# Patient Record
Sex: FEMALE | Race: WHITE | Employment: FULL TIME | URBAN - METROPOLITAN AREA
[De-identification: names, ages, dates, MRNs, and addresses within clinical notes are randomized per-mention and may not be internally consistent; named-entity substitution may affect disease eponyms.]

---

## 2021-02-04 ENCOUNTER — HOSPITAL ENCOUNTER (EMERGENCY)
Age: 61
Discharge: HOME | End: 2021-02-04
Attending: EMERGENCY MEDICINE
Payer: COMMERCIAL

## 2021-02-04 VITALS
BODY MASS INDEX: 20.61 KG/M2 | TEMPERATURE: 97.8 F | OXYGEN SATURATION: 98 % | SYSTOLIC BLOOD PRESSURE: 155 MMHG | HEIGHT: 62 IN | DIASTOLIC BLOOD PRESSURE: 99 MMHG | RESPIRATION RATE: 16 BRPM | WEIGHT: 112 LBS | HEART RATE: 108 BPM

## 2021-02-04 DIAGNOSIS — H11.32 SUBCONJUNCTIVAL HEMORRHAGE OF LEFT EYE: Primary | ICD-10-CM

## 2021-02-04 LAB
BASOPHILS ABSOLUTE: 0 K/UL (ref 0–0.2)
BASOPHILS RELATIVE PERCENT: 0.6 %
EOSINOPHILS ABSOLUTE: 0.1 K/UL (ref 0–0.6)
EOSINOPHILS RELATIVE PERCENT: 1.4 %
HCT VFR BLD CALC: 45.7 % (ref 36–48)
HEMOGLOBIN: 15 G/DL (ref 12–16)
INR BLD: 2.02 (ref 0.86–1.14)
LYMPHOCYTES ABSOLUTE: 1.7 K/UL (ref 1–5.1)
LYMPHOCYTES RELATIVE PERCENT: 34.4 %
MCH RBC QN AUTO: 31.3 PG (ref 26–34)
MCHC RBC AUTO-ENTMCNC: 33 G/DL (ref 31–36)
MCV RBC AUTO: 95 FL (ref 80–100)
MONOCYTES ABSOLUTE: 0.4 K/UL (ref 0–1.3)
MONOCYTES RELATIVE PERCENT: 8.3 %
NEUTROPHILS ABSOLUTE: 2.7 K/UL (ref 1.7–7.7)
NEUTROPHILS RELATIVE PERCENT: 55.3 %
PDW BLD-RTO: 13.7 % (ref 12.4–15.4)
PLATELET # BLD: 156 K/UL (ref 135–450)
PMV BLD AUTO: 8.8 FL (ref 5–10.5)
PROTHROMBIN TIME: 23.6 SEC (ref 10–13.2)
RBC # BLD: 4.81 M/UL (ref 4–5.2)
WBC # BLD: 4.8 K/UL (ref 4–11)

## 2021-02-04 PROCEDURE — 85610 PROTHROMBIN TIME: CPT

## 2021-02-04 PROCEDURE — 6370000000 HC RX 637 (ALT 250 FOR IP): Performed by: PHYSICIAN ASSISTANT

## 2021-02-04 PROCEDURE — 99283 EMERGENCY DEPT VISIT LOW MDM: CPT

## 2021-02-04 PROCEDURE — 85025 COMPLETE CBC W/AUTO DIFF WBC: CPT

## 2021-02-04 RX ORDER — LISINOPRIL 5 MG/1
5 TABLET ORAL DAILY
COMMUNITY
Start: 2021-01-21

## 2021-02-04 RX ORDER — TETRACAINE HYDROCHLORIDE 5 MG/ML
1 SOLUTION OPHTHALMIC ONCE
Status: COMPLETED | OUTPATIENT
Start: 2021-02-04 | End: 2021-02-04

## 2021-02-04 RX ORDER — AMLODIPINE BESYLATE 5 MG/1
5 TABLET ORAL DAILY
COMMUNITY
Start: 2021-01-21

## 2021-02-04 RX ORDER — WARFARIN SODIUM 5 MG/1
5 TABLET ORAL DAILY
Status: ON HOLD | COMMUNITY
Start: 2020-11-11 | End: 2021-08-26 | Stop reason: SDUPTHER

## 2021-02-04 RX ADMIN — TETRACAINE HYDROCHLORIDE 1 DROP: 5 SOLUTION OPHTHALMIC at 10:48

## 2021-02-04 RX ADMIN — FLUORESCEIN SODIUM 1 MG: 1 STRIP OPHTHALMIC at 10:49

## 2021-02-04 ASSESSMENT — VISUAL ACUITY
OU: 1
OD: 20/25
OU: 20/20

## 2021-02-04 ASSESSMENT — ENCOUNTER SYMPTOMS
EYE PAIN: 0
EYE REDNESS: 1
COUGH: 0
VOMITING: 0
RHINORRHEA: 0
ABDOMINAL PAIN: 0
TROUBLE SWALLOWING: 0
SHORTNESS OF BREATH: 0
WHEEZING: 0
EYE ITCHING: 0
FACIAL SWELLING: 0
SORE THROAT: 0
DIARRHEA: 0
NAUSEA: 0
CHEST TIGHTNESS: 0
EYE DISCHARGE: 0
BACK PAIN: 0
PHOTOPHOBIA: 0

## 2021-02-04 NOTE — ED NOTES
Pt is alert and oriented times four. Pt here today for pressure in her left eye and blood collected in her left eye. Pt states started this am. Pt denies injury to her left eye and denies pain. Pt straight stuck for blood. No other needs at this time will continue to monitor.       Petra Osborne RN  02/04/21 1052

## 2021-02-04 NOTE — ED PROVIDER NOTES
light-headedness, numbness and headaches. Past Medical, Surgical, Family, and Social History     She has a past medical history of Anticoagulant disorder ([address] and Hypertension. She has no past surgical history on file. Her family history is not on file. She reports that she has never smoked. She has never used smokeless tobacco. She reports previous alcohol use. She reports previous drug use. Medications     Previous Medications    AMLODIPINE (NORVASC) 5 MG TABLET    Take 5 mg by mouth daily    LISINOPRIL (PRINIVIL;ZESTRIL) 5 MG TABLET    Take 5 mg by mouth daily    WARFARIN (COUMADIN) 5 MG TABLET    Take 5 mg by mouth daily       Allergies     She is allergic to shellfish allergy. Physical Exam     INITIAL VITALS: BP: (!) 167/104, Temp: 97.8 °F (36.6 °C), Pulse: 134, Resp: 17, SpO2: 96 %  Physical Exam  Vitals signs and nursing note reviewed. Constitutional:       General: She is not in acute distress. Appearance: Normal appearance. She is not ill-appearing, toxic-appearing or diaphoretic. HENT:      Head: Normocephalic and atraumatic. Right Ear: External ear normal.      Nose: Nose normal.      Mouth/Throat:      Pharynx: Oropharynx is clear. Eyes:      General: Lids are normal. Vision grossly intact. Gaze aligned appropriately. Right eye: No foreign body, discharge or hordeolum. Left eye: No foreign body, discharge or hordeolum. Extraocular Movements: Extraocular movements intact. Conjunctiva/sclera:      Right eye: Right conjunctiva is not injected. No chemosis, exudate or hemorrhage. Left eye: Left conjunctiva is not injected. Chemosis and hemorrhage present. No exudate. Pupils: Pupils are equal, round, and reactive to light. Right eye: Pupil is round and reactive. Left eye: Pupil is round and reactive. No corneal abrasion or fluorescein uptake. Funduscopic exam:     Right eye: No hemorrhage. Left eye: No hemorrhage. with spontaneous subconjunctival hemorrhage to the left eye. No trauma or injury to the eye. No pain with eye movement or periorbital redness or swelling. She does not wear glasses or contacts except to read. Patient has no acute eye pain and no acute vision changes. She has no floaters or complaints of flashing lights. She has no layering of hyphema on exam.  She does have a subconjunctival hemorrhage to the medial aspect of the left eye. She does have a history of hypertension and is also on Coumadin. Patient has normal Cyrus-Pen pressures. She has normal visual acuity. She had no uptake with fluorescein. Pro time was 23.6 with INR of 2.0 and CBC showed a white count of 4.8 with hemoglobin of 15.0 and 156 platelets. Patient will be referred to [facility] for follow-up. Patient is also referred to PCP for follow-up for her blood pressure. At this point she stable for discharge. Blood pressure and pulse have improved since patient has been here. This patient was also evaluated by the attending physician. All care plans were discussed and agreed upon. Clinical Impression     1.  Subconjunctival hemorrhage of left eye        Disposition     PATIENT REFERRED TO:  [facility]  [address] [facility]    If symptoms worsen    [address]  [phone]    Schedule an appointment as soon as possible for a visit in 2 days      your doctor      for blood pressure recheck      DISCHARGE MEDICATIONS:  New Prescriptions    No medications on file       DISPOSITION  discharged        James Johnson  02/04/21 Liu Calvillo [place]  02/04/21 4876

## 2021-02-04 NOTE — ED PROVIDER NOTES
ED Attending Attestation Note     Date of evaluation: 2/4/2021    This patient was seen by the advance practice provider. I have seen and examined the patient, agree with the workup, evaluation, management and diagnosis. The care plan has been discussed. My assessment reveals some conjunctival hemorrhage in the medial left eye; no abrasion is noted. Normal intraocular pressures, no sign of hyphema. No trauma to the eye. Zohra Junior MD  02/04/21 1124

## 2021-02-04 NOTE — ED NOTES
Pt discharged from ED in stable, ambulatory condition. Discharge instructions explained, all questions answered. Pt walked to [facility] independently.        [place], RN  02/04/21 7852

## 2021-08-23 ENCOUNTER — HOSPITAL ENCOUNTER (INPATIENT)
Age: 61
LOS: 3 days | Discharge: HOME | DRG: 177 | End: 2021-08-26
Attending: EMERGENCY MEDICINE | Admitting: INTERNAL MEDICINE
Payer: COMMERCIAL

## 2021-08-23 ENCOUNTER — APPOINTMENT (OUTPATIENT)
Dept: GENERAL RADIOLOGY | Age: 61
DRG: 177 | End: 2021-08-23
Payer: COMMERCIAL

## 2021-08-23 DIAGNOSIS — U07.1 ACUTE HYPOXEMIC RESPIRATORY FAILURE DUE TO COVID-19 (HCC): ICD-10-CM

## 2021-08-23 DIAGNOSIS — J96.01 ACUTE HYPOXEMIC RESPIRATORY FAILURE (HCC): Primary | ICD-10-CM

## 2021-08-23 DIAGNOSIS — R79.1 SUPRATHERAPEUTIC INR: ICD-10-CM

## 2021-08-23 DIAGNOSIS — J96.01 ACUTE HYPOXEMIC RESPIRATORY FAILURE DUE TO COVID-19 (HCC): ICD-10-CM

## 2021-08-23 DIAGNOSIS — U07.1 COVID-19: ICD-10-CM

## 2021-08-23 PROBLEM — J12.82 PNEUMONIA DUE TO 2019 NOVEL CORONAVIRUS: Status: ACTIVE | Noted: 2021-08-23

## 2021-08-23 PROBLEM — E87.1 HYPONATREMIA: Status: ACTIVE | Noted: 2021-08-23

## 2021-08-23 LAB
ALBUMIN SERPL-MCNC: 3.4 G/DL (ref 3.4–5)
ALP BLD-CCNC: 42 U/L (ref 40–129)
ALT SERPL-CCNC: 14 U/L (ref 10–40)
ANION GAP SERPL CALCULATED.3IONS-SCNC: 15 MMOL/L (ref 3–16)
ANION GAP SERPL CALCULATED.3IONS-SCNC: 15 MMOL/L (ref 3–16)
AST SERPL-CCNC: 34 U/L (ref 15–37)
BASE EXCESS VENOUS: 6.3 MMOL/L (ref -2–3)
BASOPHILS ABSOLUTE: 0 K/UL (ref 0–0.2)
BASOPHILS RELATIVE PERCENT: 0.1 %
BILIRUB SERPL-MCNC: 0.4 MG/DL (ref 0–1)
BILIRUBIN DIRECT: <0.2 MG/DL (ref 0–0.3)
BILIRUBIN, INDIRECT: NORMAL MG/DL (ref 0–1)
BUN BLDV-MCNC: 11 MG/DL (ref 7–20)
BUN BLDV-MCNC: 8 MG/DL (ref 7–20)
C-REACTIVE PROTEIN: 117.9 MG/L (ref 0–5.1)
CALCIUM SERPL-MCNC: 8.7 MG/DL (ref 8.3–10.6)
CALCIUM SERPL-MCNC: 9 MG/DL (ref 8.3–10.6)
CARBOXYHEMOGLOBIN: 1.1 % (ref 0–1.5)
CHLORIDE BLD-SCNC: 93 MMOL/L (ref 99–110)
CHLORIDE BLD-SCNC: 98 MMOL/L (ref 99–110)
CO2: 21 MMOL/L (ref 21–32)
CO2: 22 MMOL/L (ref 21–32)
CREAT SERPL-MCNC: 0.8 MG/DL (ref 0.6–1.2)
CREAT SERPL-MCNC: 0.8 MG/DL (ref 0.6–1.2)
EOSINOPHILS ABSOLUTE: 0 K/UL (ref 0–0.6)
EOSINOPHILS RELATIVE PERCENT: 0 %
FERRITIN: 1240 NG/ML (ref 15–150)
FIBRINOGEN: 424 MG/DL (ref 200–397)
GFR AFRICAN AMERICAN: >60
GFR AFRICAN AMERICAN: >60
GFR NON-AFRICAN AMERICAN: >60
GFR NON-AFRICAN AMERICAN: >60
GLUCOSE BLD-MCNC: 132 MG/DL (ref 70–99)
GLUCOSE BLD-MCNC: 141 MG/DL (ref 70–99)
HCO3 VENOUS: 32.2 MMOL/L (ref 24–28)
HCT VFR BLD CALC: 44.6 % (ref 36–48)
HEMOGLOBIN, VEN, REDUCED: 67.5 %
HEMOGLOBIN: 15.2 G/DL (ref 12–16)
INR BLD: >14.27 (ref 0.88–1.12)
LACTATE DEHYDROGENASE: 491 U/L (ref 100–190)
LACTIC ACID: 1.2 MMOL/L (ref 0.4–2)
LYMPHOCYTES ABSOLUTE: 0.3 K/UL (ref 1–5.1)
LYMPHOCYTES RELATIVE PERCENT: 7 %
MCH RBC QN AUTO: 31.2 PG (ref 26–34)
MCHC RBC AUTO-ENTMCNC: 34.2 G/DL (ref 31–36)
MCV RBC AUTO: 91.3 FL (ref 80–100)
METHEMOGLOBIN VENOUS: 0 % (ref 0–1.5)
MONOCYTES ABSOLUTE: 0.4 K/UL (ref 0–1.3)
MONOCYTES RELATIVE PERCENT: 8.5 %
NEUTROPHILS ABSOLUTE: 3.9 K/UL (ref 1.7–7.7)
NEUTROPHILS RELATIVE PERCENT: 84.4 %
O2 SAT, VEN: 32 %
PCO2, VEN: 49 MMHG (ref 41–51)
PDW BLD-RTO: 13.5 % (ref 12.4–15.4)
PH VENOUS: 7.43 (ref 7.35–7.45)
PLATELET # BLD: 92 K/UL (ref 135–450)
PLATELET SLIDE REVIEW: ABNORMAL
PMV BLD AUTO: 9.4 FL (ref 5–10.5)
PO2, VEN: ABNORMAL MMHG (ref 25–40)
POTASSIUM REFLEX MAGNESIUM: 4.1 MMOL/L (ref 3.5–5.1)
POTASSIUM SERPL-SCNC: 4.2 MMOL/L (ref 3.5–5.1)
PROCALCITONIN: 0.1 NG/ML (ref 0–0.15)
PROTHROMBIN TIME: >170 SEC (ref 9.9–12.7)
RAPID INFLUENZA  B AGN: NEGATIVE
RAPID INFLUENZA A AGN: NEGATIVE
RBC # BLD: 4.89 M/UL (ref 4–5.2)
REASON FOR REJECTION: NORMAL
REASON FOR REJECTION: NORMAL
REJECTED TEST: NORMAL
REJECTED TEST: NORMAL
SLIDE REVIEW: ABNORMAL
SODIUM BLD-SCNC: 130 MMOL/L (ref 136–145)
SODIUM BLD-SCNC: 134 MMOL/L (ref 136–145)
TCO2 CALC VENOUS: 34 MMOL/L
TOTAL PROTEIN: 7.7 G/DL (ref 6.4–8.2)
VITAMIN D 25-HYDROXY: 30.9 NG/ML
WBC # BLD: 4.6 K/UL (ref 4–11)

## 2021-08-23 PROCEDURE — 80048 BASIC METABOLIC PNL TOTAL CA: CPT

## 2021-08-23 PROCEDURE — 99283 EMERGENCY DEPT VISIT LOW MDM: CPT

## 2021-08-23 PROCEDURE — 80076 HEPATIC FUNCTION PANEL: CPT

## 2021-08-23 PROCEDURE — 6360000002 HC RX W HCPCS: Performed by: INTERNAL MEDICINE

## 2021-08-23 PROCEDURE — 84145 PROCALCITONIN (PCT): CPT

## 2021-08-23 PROCEDURE — 6360000002 HC RX W HCPCS: Performed by: EMERGENCY MEDICINE

## 2021-08-23 PROCEDURE — 2580000003 HC RX 258: Performed by: INTERNAL MEDICINE

## 2021-08-23 PROCEDURE — 96374 THER/PROPH/DIAG INJ IV PUSH: CPT

## 2021-08-23 PROCEDURE — 6370000000 HC RX 637 (ALT 250 FOR IP): Performed by: EMERGENCY MEDICINE

## 2021-08-23 PROCEDURE — 2580000003 HC RX 258: Performed by: EMERGENCY MEDICINE

## 2021-08-23 PROCEDURE — 85610 PROTHROMBIN TIME: CPT

## 2021-08-23 PROCEDURE — 36415 COLL VENOUS BLD VENIPUNCTURE: CPT

## 2021-08-23 PROCEDURE — 71045 X-RAY EXAM CHEST 1 VIEW: CPT

## 2021-08-23 PROCEDURE — 83615 LACTATE (LD) (LDH) ENZYME: CPT

## 2021-08-23 PROCEDURE — 85384 FIBRINOGEN ACTIVITY: CPT

## 2021-08-23 PROCEDURE — XW033E5 INTRODUCTION OF REMDESIVIR ANTI-INFECTIVE INTO PERIPHERAL VEIN, PERCUTANEOUS APPROACH, NEW TECHNOLOGY GROUP 5: ICD-10-PCS | Performed by: INTERNAL MEDICINE

## 2021-08-23 PROCEDURE — 84300 ASSAY OF URINE SODIUM: CPT

## 2021-08-23 PROCEDURE — 2060000000 HC ICU INTERMEDIATE R&B

## 2021-08-23 PROCEDURE — 82803 BLOOD GASES ANY COMBINATION: CPT

## 2021-08-23 PROCEDURE — 85025 COMPLETE CBC W/AUTO DIFF WBC: CPT

## 2021-08-23 PROCEDURE — 82306 VITAMIN D 25 HYDROXY: CPT

## 2021-08-23 PROCEDURE — 87804 INFLUENZA ASSAY W/OPTIC: CPT

## 2021-08-23 PROCEDURE — 81001 URINALYSIS AUTO W/SCOPE: CPT

## 2021-08-23 PROCEDURE — 86140 C-REACTIVE PROTEIN: CPT

## 2021-08-23 PROCEDURE — 2500000003 HC RX 250 WO HCPCS: Performed by: INTERNAL MEDICINE

## 2021-08-23 PROCEDURE — 6370000000 HC RX 637 (ALT 250 FOR IP): Performed by: INTERNAL MEDICINE

## 2021-08-23 PROCEDURE — 82728 ASSAY OF FERRITIN: CPT

## 2021-08-23 PROCEDURE — 83605 ASSAY OF LACTIC ACID: CPT

## 2021-08-23 RX ORDER — SODIUM CHLORIDE, SODIUM LACTATE, POTASSIUM CHLORIDE, AND CALCIUM CHLORIDE .6; .31; .03; .02 G/100ML; G/100ML; G/100ML; G/100ML
500 INJECTION, SOLUTION INTRAVENOUS ONCE
Status: COMPLETED | OUTPATIENT
Start: 2021-08-23 | End: 2021-08-23

## 2021-08-23 RX ORDER — POLYETHYLENE GLYCOL 3350 17 G/17G
17 POWDER, FOR SOLUTION ORAL DAILY PRN
Status: DISCONTINUED | OUTPATIENT
Start: 2021-08-23 | End: 2021-08-26 | Stop reason: HOSPADM

## 2021-08-23 RX ORDER — VITAMIN B COMPLEX
2000 TABLET ORAL DAILY
Status: DISCONTINUED | OUTPATIENT
Start: 2021-08-23 | End: 2021-08-26 | Stop reason: HOSPADM

## 2021-08-23 RX ORDER — SIMETHICONE 80 MG
80 TABLET,CHEWABLE ORAL EVERY 6 HOURS PRN
Status: DISCONTINUED | OUTPATIENT
Start: 2021-08-23 | End: 2021-08-26 | Stop reason: HOSPADM

## 2021-08-23 RX ORDER — ACETAMINOPHEN 650 MG/1
650 SUPPOSITORY RECTAL EVERY 6 HOURS PRN
Status: DISCONTINUED | OUTPATIENT
Start: 2021-08-23 | End: 2021-08-26 | Stop reason: HOSPADM

## 2021-08-23 RX ORDER — ZINC SULFATE 50(220)MG
50 CAPSULE ORAL DAILY
Status: DISCONTINUED | OUTPATIENT
Start: 2021-08-23 | End: 2021-08-26 | Stop reason: HOSPADM

## 2021-08-23 RX ORDER — SODIUM CHLORIDE 0.9 % (FLUSH) 0.9 %
5-40 SYRINGE (ML) INJECTION PRN
Status: DISCONTINUED | OUTPATIENT
Start: 2021-08-23 | End: 2021-08-23 | Stop reason: SDUPTHER

## 2021-08-23 RX ORDER — METHYLPREDNISOLONE SODIUM SUCCINATE 40 MG/ML
40 INJECTION, POWDER, LYOPHILIZED, FOR SOLUTION INTRAMUSCULAR; INTRAVENOUS EVERY 12 HOURS
Status: DISCONTINUED | OUTPATIENT
Start: 2021-08-23 | End: 2021-08-26 | Stop reason: HOSPADM

## 2021-08-23 RX ORDER — SODIUM CHLORIDE 0.9 % (FLUSH) 0.9 %
5-40 SYRINGE (ML) INJECTION EVERY 12 HOURS SCHEDULED
Status: DISCONTINUED | OUTPATIENT
Start: 2021-08-23 | End: 2021-08-23 | Stop reason: SDUPTHER

## 2021-08-23 RX ORDER — SODIUM CHLORIDE 0.9 % (FLUSH) 0.9 %
5-40 SYRINGE (ML) INJECTION EVERY 12 HOURS SCHEDULED
Status: DISCONTINUED | OUTPATIENT
Start: 2021-08-23 | End: 2021-08-26 | Stop reason: HOSPADM

## 2021-08-23 RX ORDER — ACETAMINOPHEN 325 MG/1
650 TABLET ORAL EVERY 6 HOURS PRN
Status: DISCONTINUED | OUTPATIENT
Start: 2021-08-23 | End: 2021-08-26 | Stop reason: HOSPADM

## 2021-08-23 RX ORDER — ONDANSETRON 4 MG/1
4 TABLET, ORALLY DISINTEGRATING ORAL EVERY 8 HOURS PRN
Status: DISCONTINUED | OUTPATIENT
Start: 2021-08-23 | End: 2021-08-23 | Stop reason: SDUPTHER

## 2021-08-23 RX ORDER — ONDANSETRON 2 MG/ML
4 INJECTION INTRAMUSCULAR; INTRAVENOUS EVERY 6 HOURS PRN
Status: DISCONTINUED | OUTPATIENT
Start: 2021-08-23 | End: 2021-08-23 | Stop reason: SDUPTHER

## 2021-08-23 RX ORDER — ACETAMINOPHEN 650 MG/1
650 SUPPOSITORY RECTAL EVERY 6 HOURS PRN
Status: DISCONTINUED | OUTPATIENT
Start: 2021-08-23 | End: 2021-08-23 | Stop reason: SDUPTHER

## 2021-08-23 RX ORDER — SODIUM CHLORIDE 0.9 % (FLUSH) 0.9 %
5-40 SYRINGE (ML) INJECTION PRN
Status: DISCONTINUED | OUTPATIENT
Start: 2021-08-23 | End: 2021-08-26 | Stop reason: HOSPADM

## 2021-08-23 RX ORDER — GUAIFENESIN/DEXTROMETHORPHAN 100-10MG/5
5 SYRUP ORAL EVERY 4 HOURS PRN
Status: DISCONTINUED | OUTPATIENT
Start: 2021-08-23 | End: 2021-08-26 | Stop reason: HOSPADM

## 2021-08-23 RX ORDER — PANTOPRAZOLE SODIUM 40 MG/1
40 TABLET, DELAYED RELEASE ORAL
Status: DISCONTINUED | OUTPATIENT
Start: 2021-08-23 | End: 2021-08-26 | Stop reason: HOSPADM

## 2021-08-23 RX ORDER — PHYTONADIONE 5 MG/1
5 TABLET ORAL ONCE
Status: COMPLETED | OUTPATIENT
Start: 2021-08-23 | End: 2021-08-23

## 2021-08-23 RX ORDER — SODIUM CHLORIDE 9 MG/ML
25 INJECTION, SOLUTION INTRAVENOUS PRN
Status: DISCONTINUED | OUTPATIENT
Start: 2021-08-23 | End: 2021-08-23 | Stop reason: SDUPTHER

## 2021-08-23 RX ORDER — ONDANSETRON 2 MG/ML
4 INJECTION INTRAMUSCULAR; INTRAVENOUS EVERY 6 HOURS PRN
Status: DISCONTINUED | OUTPATIENT
Start: 2021-08-23 | End: 2021-08-26 | Stop reason: HOSPADM

## 2021-08-23 RX ORDER — ACETAMINOPHEN 325 MG/1
650 TABLET ORAL EVERY 6 HOURS PRN
Status: DISCONTINUED | OUTPATIENT
Start: 2021-08-23 | End: 2021-08-23 | Stop reason: SDUPTHER

## 2021-08-23 RX ORDER — POLYETHYLENE GLYCOL 3350 17 G/17G
17 POWDER, FOR SOLUTION ORAL DAILY PRN
Status: DISCONTINUED | OUTPATIENT
Start: 2021-08-23 | End: 2021-08-23 | Stop reason: SDUPTHER

## 2021-08-23 RX ORDER — 0.9 % SODIUM CHLORIDE 0.9 %
30 INTRAVENOUS SOLUTION INTRAVENOUS PRN
Status: DISCONTINUED | OUTPATIENT
Start: 2021-08-23 | End: 2021-08-26 | Stop reason: HOSPADM

## 2021-08-23 RX ORDER — ONDANSETRON 2 MG/ML
4 INJECTION INTRAMUSCULAR; INTRAVENOUS ONCE
Status: COMPLETED | OUTPATIENT
Start: 2021-08-23 | End: 2021-08-23

## 2021-08-23 RX ORDER — PROMETHAZINE HYDROCHLORIDE 25 MG/1
12.5 TABLET ORAL EVERY 6 HOURS PRN
Status: DISCONTINUED | OUTPATIENT
Start: 2021-08-23 | End: 2021-08-26 | Stop reason: HOSPADM

## 2021-08-23 RX ORDER — SODIUM CHLORIDE 9 MG/ML
25 INJECTION, SOLUTION INTRAVENOUS PRN
Status: DISCONTINUED | OUTPATIENT
Start: 2021-08-23 | End: 2021-08-26 | Stop reason: HOSPADM

## 2021-08-23 RX ADMIN — PHYTONADIONE 5 MG: 5 TABLET ORAL at 16:28

## 2021-08-23 RX ADMIN — ONDANSETRON 4 MG: 2 INJECTION INTRAMUSCULAR; INTRAVENOUS at 22:24

## 2021-08-23 RX ADMIN — SODIUM CHLORIDE, POTASSIUM CHLORIDE, SODIUM LACTATE AND CALCIUM CHLORIDE 500 ML: 600; 310; 30; 20 INJECTION, SOLUTION INTRAVENOUS at 12:21

## 2021-08-23 RX ADMIN — REMDESIVIR 200 MG: 100 INJECTION, POWDER, LYOPHILIZED, FOR SOLUTION INTRAVENOUS at 17:59

## 2021-08-23 RX ADMIN — Medication 2000 UNITS: at 16:28

## 2021-08-23 RX ADMIN — Medication 5 ML: at 20:21

## 2021-08-23 RX ADMIN — ONDANSETRON 4 MG: 2 INJECTION INTRAMUSCULAR; INTRAVENOUS at 12:44

## 2021-08-23 RX ADMIN — ZINC SULFATE 220 MG (50 MG) CAPSULE 50 MG: CAPSULE at 20:20

## 2021-08-23 RX ADMIN — Medication 40 MG: at 16:27

## 2021-08-23 RX ADMIN — Medication 10 ML: at 22:25

## 2021-08-23 ASSESSMENT — PAIN DESCRIPTION - PAIN TYPE: TYPE: ACUTE PAIN

## 2021-08-23 ASSESSMENT — PAIN DESCRIPTION - LOCATION: LOCATION: ABDOMEN

## 2021-08-23 ASSESSMENT — PAIN SCALES - GENERAL
PAINLEVEL_OUTOF10: 6
PAINLEVEL_OUTOF10: 0
PAINLEVEL_OUTOF10: 0

## 2021-08-23 NOTE — H&P
Hospital Medicine History & Physical    PCP: No primary care provider on file. Date of Admission: 8/23/2021    Date of Service: Pt seen/examined on 08/23/21 and Admitted to Inpatient with expected LOS greater than two midnights due to medical therapy. Chief Complaint:  Multiple complains, worsening flu like symptoms      History Of Present Illness:   62 y.o. 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  - She started to have symptoms x 10 days back which she thought was a sinus infection, with increased sinus pressure, followed by feeling quesy in her stomach, loose watery diarrhea, later started to have high grade fever around 103 F, got COVID Ag test done from [facility] which came positive, she has been having progressively worsening symptoms, faye fatigue and nausea and has had decreased po intake due to this. She has been trying to eat fruits and drink water intermittent. She was found to be hypoxic to 86 % on room air and placed on 3L NC to maintain O2 sats, when walked to and from bathroom without oxygen, oxygen saturation droped in the high 70's spo2. She is not vaccinated for COVID, she was worried about the s/e of blood clots faye with her underlying lupus ac +ve status. On admission BP ok, HR 110s, labs with Na 130, no leukocytosis, lymphopenia and thrombocytopenia noted. INR was > 14 and 5 mg oral vitamin K was given in the ED. Xray showed bilateral lower lobe ground glass opacities consistent with COVID19 pneumonia, and will be admitted to progressive care unit for acute respiratory failure due to [address] pneumonia    Past Medical History:          Diagnosis Date    Anticoagulant disorder ([facility] [address] [facility]     Hypertension        Past Surgical History:      No past surgical history on file.     Medications Prior to Admission: Prior to Admission medications    Medication Sig Start Date End Date Taking? Authorizing Provider   amLODIPine (NORVASC) 5 MG tablet Take 5 mg by mouth daily 1/21/21   Historical Provider, MD   lisinopril (PRINIVIL;ZESTRIL) 5 MG tablet Take 5 mg by mouth daily 1/21/21   Historical Provider, MD   warfarin (COUMADIN) 5 MG tablet Take 5 mg by mouth daily 11/11/20   Historical Provider, MD       Allergies:  Shellfish allergy    Social History:      The patient currently lives at home with her son    TOBACCO:   reports that she has never smoked. She has never used smokeless tobacco.  ETOH:   reports previous alcohol use. Works as a sale person for [employer]    Family History:      No family history on file. REVIEW OF SYSTEMS:   Pertinent positives as noted in the HPI. All other systems reviewed and negative. PHYSICAL EXAM PERFORMED:    /89   Pulse 109   Temp 99.1 °F (37.3 °C) (Oral)   Resp 16   SpO2 98%     General appearance:  Ill appearing  HEENT:  Normal cephalic, atraumatic without obvious deformity. Pupils equal, round, and reactive to light. Extra ocular muscles intact. Conjunctivae/corneas clear. Neck: Supple, with full range of motion. No jugular venous distention. Trachea midline. Respiratory:  Normal respiratory effort. Decreased breath sounds, doesn't have strong respiratory effort, significant crackles bilaterally from bases up to mid lungs  Cardiovascular:  Regular rate and rhythm with normal S1/S2 without murmurs, rubs or gallops. Abdomen: Soft, non-tender, non-distended with normal bowel sounds. Musculoskeletal:  No clubbing, cyanosis or edema bilaterally. Full range of motion without deformity. Skin: Skin color, texture, turgor normal.  No rashes or lesions. Neurologic:  Neurovascularly intact without any focal sensory/motor deficits.  Cranial nerves: II-XII intact, grossly non-focal.  Psychiatric:  Alert and oriented, thought content appropriate, normal insight  Capillary Refill: Brisk,< 3 seconds   Peripheral Pulses: +2 palpable, equal bilaterally       Labs:     Recent Labs     08/23/21  1219   WBC 4.6   HGB 15.2   HCT 44.6   PLT 92*     Recent Labs     08/23/21  1219   *   K 4.1   CL 93*   CO2 22   BUN 11   CREATININE 0.8   CALCIUM 9.0     No results for input(s): AST, ALT, BILIDIR, BILITOT, ALKPHOS in the last 72 hours. Recent Labs     08/23/21  1444   INR >14.27*     No results for input(s): [place] in the last 72 hours. Urinalysis:    No results found for: Luisa Siu, [address], Carlota Professor Eleazar Mayer [facility] [address], [address] [place] [address] 27, [facility] 994    Radiology:     CXR: I have reviewed the CXR with the following interpretation: reviewed, bilateral lower lung airspace disease consistent with COVID19  EKG:  I have reviewed the EKG with the following interpretation: n/a    XR CHEST PORTABLE   Final Result      Bilateral lower lung airspace disease concerning for atypical/Covid 19 pneumonia. ASSESSMENT:    Active Hospital Problems    Diagnosis Date Noted    Acute hypoxemic respiratory failure due to COVID-19 (HCC) [U07.1, J96.01] 08/23/2021     Priority: High    Pneumonia due to 2019 novel coronavirus [U07.1, J12.82] 08/23/2021    Hyponatremia [E87.1] 08/23/2021    Supratherapeutic INR [R79.1] 08/23/2021     Acute respiratory failure due to 2019 n-coronavirus pneumonia, progressive symptoms x 1 weeks, [facility]  Exclude other causes - Rapid flu ordered  Leukopenia/normal WBC with lymphopenia; pro calcitonin negative; so will hold of bacterial etiology work up as low suspicion  Check Hepatic profile    Imaging: Reviewed, as above    Did not get sepsis protocol fluids as recommended to keep patients on the drier side with COVID19, but with sinus tachycardia, given IVF in the ED.    Check inflammatory markers and trend  Start Vitamin D 2000 units daily  Start Zn sulfate 50 mg OD  Start Solumedrol 40 mg IV Bid for now  If no significant transamites will proceed with Remdisevir rx  Lab Results   Component Value Date    CREATININE 0.8 08/23/2021    BUN 11 08/23/2021     (L) 08/23/2021    K 4.1 08/23/2021    CL 93 (L) 08/23/2021    CO2 22 08/23/2021     Remdesivir Initiation Note    Jt Kurtz meets criteria for initiation of remdesivir under the emergency use authorization (EUA) based on the following:   Known or suspected COVID-19   Severe disease (SpO2 ? 94% on RA, requiring supplemental O2, or requiring invasive mechanical ventilation)   Acceptable renal function  o CrCl ? 30 ml/min based on SCr obtained prior to initiation OR   o CrCl < 30 ml/min but the potential benefit of remdesivir outweighs the risk   Acceptable hepatic function (ALT within 5 times ULN)    I have discussed with the patient/proxy information consistent with the Fact Sheet for Patients and Parents/Caregivers\" and the patient/proxy has agreed to initiating remdesivir after being:   Given the Fact Sheet for Patients and Parents/Caregivers   Informed of the alternatives to receiving remdesivir, and    Informed that remdesivir is an unapproved drug that is authorized for use under EUA    Monitor O2 sats, place O2 to maintain for SpO2 > 90%  If Severe respiratory failure and need for noninvasive positive pressure ventilation, need negative pressure room as is more likely causes aerosolized secretions (BiPaP, CPAP, HiFlo NC). N95 with proper fitting for aerosolized procedures  Personal protective equipment including gown gloves surgical mask, eye protection  Monitor closely, if worse then will consider ID/Pulmonary evaluation    Hyponatremia likely due to decreased po intake, diarrhea, possible some SIADH from pneumonia. Will check UA, Urine Sodium    Supratherapeutic INR > 14 on admission, s/p oral Vit K  Follow up INR in am  Hold off any pharmacological Px at this time    GI Px: Protonix  DVT Prophylaxis: SCDs  Diet: ADULT DIET;  Regular  Code Status: Full Code (disucssed in detail, she wants all resuscitative measures to be done, ok with ventilator if needed)    PT/OT Eval Status: order once acute issues resolved    Dispo - Admit as inpatient. I anticipate hospitalization spanning more than two midnights for investigation and treatment of the above medically necessary diagnoses. Seamus Jovel MD   Hospitalist    Thank you No primary care provider on file. for the opportunity to be involved in this patient's care. If you have any questions or concerns please feel free to contact me at [phone].

## 2021-08-23 NOTE — ED NOTES
Sent a 3rd INR specimen lab keeps calling stating that blood tubes are contaminated, Patient walked to and from bathroom without oxygen, oxygen saturation droped in the high 70's spo2.       Lucy Rivera RN  08/23/21 2638

## 2021-08-23 NOTE — ED NOTES
Patient comes in with complaints of SOB that started about a week ago, patient took an Antigen test for COVID at the local [facility] and it was positive. Patient is not able to keep fluids and food down, patient has history of clotting issues.       Leny Alarcon RN  08/23/21 7340

## 2021-08-23 NOTE — ED PROVIDER NOTES
[address] [facility]          ATTENDING PHYSICIAN NOTE       Date of evaluation: 8/23/2021    Chief Complaint     Cough, Fever, Shortness of Breath, and Fatigue      History of Present Illness     Lisa Salmeron is a 62 y.o. female with history of unspecified factor deficiency, lower extremity DVT on chronic warfarin who presents for cough, shortness of breath, fever, and fatigue. Patient states she has had approximately 1 week of symptoms. Approximately 5 days ago she was tested for Covid with an antigen test and reportedly tested positive. She has continued to have progressively worsening symptoms, particularly her fatigue and nausea. She has had decreased p.o. intake due to this. She denies any bloody emesis. No chest pain. Has been taking her warfarin as prescribed. No lower extremity swelling or pain locally. Is not vaccinated for Covid. Review of Systems     Pertinent positive and negative findings as documented in the HPI. Otherwise all other systems were reviewed and were negative. Physical Exam     INITIAL VITALS: BP: 126/83, Temp: 99.1 °F (37.3 °C), Pulse: 113, Resp: 20, SpO2: (!) 86 %     Nursing note and vitals reviewed. General:  Adult female, alert and appropriately interactive. In no distress. HENT: Normocephalic and atraumatic. External ears normal. Nose appears normal externally. Eyes: Conjunctivae normal. No scleral icterus. Neck: Neck supple. No tracheal deviation present. CV: Tachycardic rate. Regular rhythm. S1/S2 auscultated. No murmurs, gallops or rubs. Pulm: Effort normal on nasal cannula oxygen. Scattered rhonchi, no wheezes or prolonged expiratory phase. GI: Soft. No distension. No tenderness. No rebound or guarding. No masses. No peritoneal signs. Musculoskeletal: No edema. No gross deformities. Neurological: Alert and appropriately interactive. Face symmetric, speech without dysarthria or obvious aphasia.  Moving all extremities spontaneously. Skin: Warm, dry. No rash. No diaphoresis or erythema. Psychiatric: Calm and cooperative with appropriate mood and affect. Procedures   Procedures    MEDICAL DECISION MAKING     MDM: Gisel Enriquez is a 62 y.o. female with history as above presenting for worsening respiratory symptoms in the setting of known Covid. On arrival, she is hypoxic into the 80s and improves on nasal cannula oxygen. She is also tachycardic. She has no signs or symptoms of DVT/PE, but is at risk given her history and active infection. However she is on warfarin and I do not suspect an acute PE given that she is critically supratherapeutic today with INR greater than 14. She has no signs of active bleeding and a reassuring hemoglobin, will be given 5 mg oral vitamin K per our protocol. Discussed with the hospitalist who accepted for further care and management. Clinical Impression     1. Acute hypoxemic respiratory failure ([address]    2. COVID-19    3. Supratherapeutic INR        Disposition     DISPOSITION Decision To Admit 08/23/2021 03:32:10 PM        Saulo Cash MD  3:40 PM                     Past Medical, Surgical, Family, and Social History     She has a past medical history of Anticoagulant disorder ([address] and Hypertension. She has no past surgical history on file. Her family history is not on file. She reports that she has never smoked. She has never used smokeless tobacco. She reports previous alcohol use. She reports previous drug use. Medications     Previous Medications    AMLODIPINE (NORVASC) 5 MG TABLET    Take 5 mg by mouth daily    LISINOPRIL (PRINIVIL;ZESTRIL) 5 MG TABLET    Take 5 mg by mouth daily    WARFARIN (COUMADIN) 5 MG TABLET    Take 5 mg by mouth daily       Allergies     She is allergic to shellfish allergy. ED Course     Nursing Notes, Past Medical Hx, Past Surgical Hx, Social Hx,Allergies, and Family Hx were reviewed.     Patient was given the following medications:  Orders Placed This Encounter   Medications    lactated ringers bolus    ondansetron (ZOFRAN) injection 4 mg    phytonadione (VITAMIN K) tablet 5 mg       Diagnostic Results       RECENT VITALS:  BP: 123/89,Temp: 99.1 °F (37.3 °C), Pulse: 109, Resp: 16, SpO2: 98 %     RADIOLOGY:  XR CHEST PORTABLE   Final Result      Bilateral lower lung airspace disease concerning for atypical/Covid 19 pneumonia.                 LABS:   Results for orders placed or performed during the hospital encounter of 08/23/21   CBC Auto Differential   Result Value Ref Range    WBC 4.6 4.0 - 11.0 K/uL    RBC 4.89 4.00 - 5.20 M/uL    Hemoglobin 15.2 12.0 - 16.0 g/dL    Hematocrit 44.6 36.0 - 48.0 %    MCV 91.3 80.0 - 100.0 fL    MCH 31.2 26.0 - 34.0 pg    MCHC 34.2 31.0 - 36.0 g/dL    RDW 13.5 12.4 - 15.4 %    Platelets 92 (L) 087 - 450 K/uL    MPV 9.4 5.0 - 10.5 fL    PLATELET SLIDE REVIEW Decreased     SLIDE REVIEW see below     Neutrophils % 84.4 %    Lymphocytes % 7.0 %    Monocytes % 8.5 %    Eosinophils % 0.0 %    Basophils % 0.1 %    Neutrophils Absolute 3.9 1.7 - 7.7 K/uL    Lymphocytes Absolute 0.3 (L) 1.0 - 5.1 K/uL    Monocytes Absolute 0.4 0.0 - 1.3 K/uL    Eosinophils Absolute 0.0 0.0 - 0.6 K/uL    Basophils Absolute 0.0 0.0 - 0.2 K/uL   Basic Metabolic Panel w/ Reflex to MG   Result Value Ref Range    Sodium 130 (L) 136 - 145 mmol/L    Potassium reflex Magnesium 4.1 3.5 - 5.1 mmol/L    Chloride 93 (L) 99 - 110 mmol/L    CO2 22 21 - 32 mmol/L    Anion Gap 15 3 - 16    Glucose 141 (H) 70 - 99 mg/dL    BUN 11 7 - 20 mg/dL    CREATININE 0.8 0.6 - 1.2 mg/dL    GFR Non-African American >60 >60    GFR African American >60 >60    Calcium 9.0 8.3 - 10.6 mg/dL   Procalcitonin   Result Value Ref Range    Procalcitonin 0.10 0.00 - 0.15 ng/mL   Blood Gas, Venous   Result Value Ref Range    pH, Benedicto 7.426 7.350 - 7.450    pCO2, Benedicto 49.0 41.0 - 51.0 mmHg    pO2, Benedicto cannot calc (AA) 25 - 40 mmHg    HCO3, Venous 32.2 (H) 24.0 - 28.0 mmol/L    Base Excess, Benedicto 6.3 (H) -2.0 - 3.0 mmol/L    O2 Sat, Benedicto 32 Not established %    Carboxyhemoglobin 1.1 0.0 - 1.5 %    MetHgb, Benedicto 0.0 0.0 - 1.5 %    TC02 (Calc), Benedicto 34 mmol/L    Hemoglobin, Benedicto, Reduced 67.50 %   Lactic Acid, Plasma   Result Value Ref Range    Lactic Acid 1.2 0.4 - 2.0 mmol/L   SPECIMEN REJECTION   Result Value Ref Range    Rejected Test PT     Reason for Rejection see below    SPECIMEN REJECTION   Result Value Ref Range    Rejected Test PT     Reason for Rejection see below    Protime-INR   Result Value Ref Range    Protime >170.0 (H) 9.9 - 12.7 sec    INR >14.27 (HH) 0.88 - 1.12       CONSULTS:  IP CONSULT TO HOSPITALIST    PATIENT REFERRED TO:  No follow-up provider specified.     DISCHARGE MEDICATIONS:  New Prescriptions    No medications on file          Aftab Yoo MD  08/23/21 5692

## 2021-08-23 NOTE — CONSULTS
Clinical Pharmacy Progress Note     Asked by  to start Remdesivir for patient who is COVID-19 positive. This patient meets criteria for initiation of remdesivir based on the following:  · Proven COVID-19 requiring hospitalization. Positive antigen test a [employer] per patient  · Moderate disease (requiring supplemental O2)     Exclusion Criteria:  · Severe disease requiring invasive or non-invasive mechanical ventilation (includes HFNC & BiPAP)  · Could consider use in patients requiring high flow if early on in the disease course (based on symptom duration)   · Use of more than 1 vasopressor prior to remdesivir initiation  · Already improving on supportive treatment and/or impending discharge  · Patients in whom the clinical team think death is in the immediate short-term where remdesivir is unlikely to change the clinical outcome     Ordered Remdesivir 200mg IV x1, following by 100mg IV q24h. Will monitor renal panel and LFT's daily. Please call with questions:  [phone] ([facility])    Joann De Leon. Bernarda DANG    8/23/2021 5:17 PM

## 2021-08-24 LAB
ALBUMIN SERPL-MCNC: 3.3 G/DL (ref 3.4–5)
ALP BLD-CCNC: 43 U/L (ref 40–129)
ALT SERPL-CCNC: 13 U/L (ref 10–40)
ANION GAP SERPL CALCULATED.3IONS-SCNC: 13 MMOL/L (ref 3–16)
AST SERPL-CCNC: 29 U/L (ref 15–37)
BILIRUB SERPL-MCNC: 0.3 MG/DL (ref 0–1)
BILIRUBIN DIRECT: <0.2 MG/DL (ref 0–0.3)
BILIRUBIN URINE: NEGATIVE
BILIRUBIN, INDIRECT: ABNORMAL MG/DL (ref 0–1)
BLOOD, URINE: ABNORMAL
BUN BLDV-MCNC: 14 MG/DL (ref 7–20)
C-REACTIVE PROTEIN: 129.1 MG/L (ref 0–5.1)
CALCIUM SERPL-MCNC: 8.9 MG/DL (ref 8.3–10.6)
CHLORIDE BLD-SCNC: 102 MMOL/L (ref 99–110)
CLARITY: CLEAR
CO2: 23 MMOL/L (ref 21–32)
COLOR: YELLOW
CREAT SERPL-MCNC: 0.7 MG/DL (ref 0.6–1.2)
EPITHELIAL CELLS, UA: ABNORMAL /HPF (ref 0–5)
GFR AFRICAN AMERICAN: >60
GFR NON-AFRICAN AMERICAN: >60
GLUCOSE BLD-MCNC: 135 MG/DL (ref 70–99)
GLUCOSE URINE: NEGATIVE MG/DL
INR BLD: >14.27 (ref 0.88–1.12)
KETONES, URINE: 15 MG/DL
LEUKOCYTE ESTERASE, URINE: ABNORMAL
MICROSCOPIC EXAMINATION: YES
NITRITE, URINE: NEGATIVE
PH UA: 7 (ref 5–8)
POTASSIUM REFLEX MAGNESIUM: 4.6 MMOL/L (ref 3.5–5.1)
PROTEIN UA: 30 MG/DL
PROTHROMBIN TIME: >170 SEC (ref 9.9–12.7)
RBC UA: ABNORMAL /HPF (ref 0–4)
SODIUM BLD-SCNC: 138 MMOL/L (ref 136–145)
SODIUM URINE: 62 MMOL/L
SPECIFIC GRAVITY UA: 1.01 (ref 1–1.03)
TOTAL PROTEIN: 7.2 G/DL (ref 6.4–8.2)
URINE TYPE: ABNORMAL
UROBILINOGEN, URINE: 1 E.U./DL
WBC UA: ABNORMAL /HPF (ref 0–5)

## 2021-08-24 PROCEDURE — 86140 C-REACTIVE PROTEIN: CPT

## 2021-08-24 PROCEDURE — 36415 COLL VENOUS BLD VENIPUNCTURE: CPT

## 2021-08-24 PROCEDURE — 85610 PROTHROMBIN TIME: CPT

## 2021-08-24 PROCEDURE — 6360000002 HC RX W HCPCS: Performed by: INTERNAL MEDICINE

## 2021-08-24 PROCEDURE — 6370000000 HC RX 637 (ALT 250 FOR IP): Performed by: INTERNAL MEDICINE

## 2021-08-24 PROCEDURE — 2060000000 HC ICU INTERMEDIATE R&B

## 2021-08-24 PROCEDURE — 80048 BASIC METABOLIC PNL TOTAL CA: CPT

## 2021-08-24 PROCEDURE — 2500000003 HC RX 250 WO HCPCS: Performed by: INTERNAL MEDICINE

## 2021-08-24 PROCEDURE — 2580000003 HC RX 258: Performed by: INTERNAL MEDICINE

## 2021-08-24 PROCEDURE — 80076 HEPATIC FUNCTION PANEL: CPT

## 2021-08-24 RX ADMIN — GUAIFENESIN AND DEXTROMETHORPHAN 5 ML: 100; 10 SYRUP ORAL at 20:22

## 2021-08-24 RX ADMIN — Medication 10 ML: at 08:08

## 2021-08-24 RX ADMIN — ZINC SULFATE 220 MG (50 MG) CAPSULE 50 MG: CAPSULE at 08:08

## 2021-08-24 RX ADMIN — REMDESIVIR 100 MG: 100 INJECTION, POWDER, LYOPHILIZED, FOR SOLUTION INTRAVENOUS at 18:49

## 2021-08-24 RX ADMIN — GUAIFENESIN AND DEXTROMETHORPHAN 5 ML: 100; 10 SYRUP ORAL at 06:23

## 2021-08-24 RX ADMIN — GUAIFENESIN AND DEXTROMETHORPHAN 5 ML: 100; 10 SYRUP ORAL at 16:05

## 2021-08-24 RX ADMIN — PANTOPRAZOLE SODIUM 40 MG: 40 TABLET, DELAYED RELEASE ORAL at 05:00

## 2021-08-24 RX ADMIN — Medication 40 MG: at 16:01

## 2021-08-24 RX ADMIN — Medication 2000 UNITS: at 08:08

## 2021-08-24 RX ADMIN — SODIUM CHLORIDE 25 ML: 9 INJECTION, SOLUTION INTRAVENOUS at 18:48

## 2021-08-24 RX ADMIN — SIMETHICONE CHEW TAB 80 MG 80 MG: 80 TABLET ORAL at 00:06

## 2021-08-24 RX ADMIN — Medication 5 ML: at 20:25

## 2021-08-24 RX ADMIN — GUAIFENESIN AND DEXTROMETHORPHAN 5 ML: 100; 10 SYRUP ORAL at 00:06

## 2021-08-24 RX ADMIN — Medication 40 MG: at 05:01

## 2021-08-24 ASSESSMENT — PAIN SCALES - GENERAL
PAINLEVEL_OUTOF10: 0

## 2021-08-24 NOTE — PROGRESS NOTES
Hospitalist Progress Note      PCP: No primary care provider on file. Date of Admission: 8/23/2021    Subjective: Seen and examined  Feeling better    Medications:  Reviewed    Infusion Medications    sodium chloride       Scheduled Medications    methylPREDNISolone  40 mg Intravenous Q12H    Vitamin D  2,000 Units Oral Daily    sodium chloride flush  5-40 mL Intravenous 2 times per day    zinc sulfate  50 mg Oral Daily    pantoprazole  40 mg Oral QAM AC    remdesivir IVPB  100 mg Intravenous Q24H     PRN Meds: guaiFENesin-dextromethorphan, sodium chloride flush, sodium chloride, acetaminophen **OR** acetaminophen, promethazine **OR** ondansetron, polyethylene glycol, sodium chloride, simethicone      Intake/Output Summary (Last 24 hours) at 8/24/2021 0946  Last data filed at 8/23/2021 2333  Gross per 24 hour   Intake 279.2 ml   Output 700 ml   Net -420.8 ml       Physical Exam Performed:    /89   Pulse 96   Temp 97.6 °F (36.4 °C) (Oral)   Resp 18   Ht 5' 2\" (1.575 m)   Wt 103 lb 9.9 oz (47 kg)   SpO2 95%   BMI 18.95 kg/m²     General appearance:  Ill appearing  HEENT:  Normal cephalic, atraumatic without obvious deformity. Pupils equal, round, and reactive to light. Extra ocular muscles intact. Conjunctivae/corneas clear. Neck: Supple, with full range of motion. No jugular venous distention. Trachea midline. Respiratory:  Normal respiratory effort. Decreased breath sounds, doesn't have strong respiratory effort, significant crackles bilaterally from bases up to mid lungs  Cardiovascular:  Regular rate and rhythm with normal S1/S2 without murmurs, rubs or gallops. Abdomen: Soft, non-tender, non-distended with normal bowel sounds. Musculoskeletal:  No clubbing, cyanosis or edema bilaterally. Full range of motion without deformity. Skin: Skin color, texture, turgor normal.  No rashes or lesions. Neurologic:  Neurovascularly intact without any focal sensory/motor deficits.  Cranial nerves: II-XII intact, grossly non-focal.  Psychiatric:  Alert and oriented, thought content appropriate, normal insight  Capillary Refill: Brisk,< 3 seconds   Peripheral Pulses: +2 palpable, equal bilaterally     Labs:   Recent Labs     08/23/21  1219   WBC 4.6   HGB 15.2   HCT 44.6   PLT 92*     Recent Labs     08/23/21  1219 08/23/21  1621 08/24/21  0449   * 134* 138   K 4.1 4.2 4.6   CL 93* 98* 102   CO2 22 21 23   BUN 11 8 14   CREATININE 0.8 0.8 0.7   CALCIUM 9.0 8.7 8.9     Recent Labs     08/23/21  1621 08/24/21  0449   AST 34 29   ALT 14 13   BILIDIR <0.2 <0.2   BILITOT 0.4 0.3   ALKPHOS 42 43     Recent Labs     08/23/21  1444 08/24/21  0449   INR >14.27* >14.27*     No results for input(s): Les Curtis in the last 72 hours. Urinalysis:      Lab Results   Component Value Date    NITRU Negative 08/23/2021    WBCUA 0-2 08/23/2021    RBCUA 21-50 08/23/2021    BLOODU LARGE 08/23/2021    SPECGRAV 1.015 08/23/2021    GLUCOSEU Negative 08/23/2021       Radiology:  XR CHEST PORTABLE   Final Result      Bilateral lower lung airspace disease concerning for atypical/Covid 19 pneumonia. Assessment/Plan:    Active Hospital Problems    Diagnosis     Pneumonia due to 2019 novel coronavirus [U07.1, J12.82]     Hyponatremia [E87.1]     Supratherapeutic INR [R79.1]     Acute hypoxemic respiratory failure due to COVID-19 (HCC) [U07.1, J96.01]      covid pna  Monitor O2 sats, place O2 to maintain for SpO2 > 90%  If Severe respiratory failure and need for noninvasive positive pressure ventilation, need negative pressure room as is more likely causes aerosolized secretions (BiPaP, CPAP, HiFlo NC).  N95 with proper fitting for aerosolized procedures  Personal protective equipment including gown gloves surgical mask, eye protection  Monitor closely, if worse then will consider ID/Pulmonary evaluation     Hyponatremia likely due to decreased po intake, diarrhea, possible some SIADH from pneumonia. Will check UA, Urine Sodium     Supratherapeutic INR > 14 on admission, s/p oral Vit K  Follow up INR in am  Hold off any pharmacological Px at this time      DVT Prophylaxis: on coumadin  Diet: ADULT DIET;  Regular  Code Status: Full Code    PT/OT Eval Status: n/a     Dispo - inpatient , tentative discharge tomorrow with home oxygen    Cherie Hilario MD

## 2021-08-24 NOTE — PROGRESS NOTES
4 Eyes Admission Assessment     I agree as the admission nurse that 2 RN's have performed a thorough Head to Toe Skin Assessment on the patient. ALL assessment sites listed below have been assessed on admission. Areas assessed by both nurses:   [x]   Head, Face, and Ears   [x]   Shoulders, Back, and Chest  [x]   Arms, Elbows, and Hands   [x]   Coccyx, Sacrum, and Ischium  [x]   Legs, Feet, and Heels        Does the Patient have Skin Breakdown?   No         John Prevention initiated:  No   Wound Care Orders initiated:  No      [facility] nurse consulted for Pressure Injury (Stage 3,4, Unstageable, DTI, NWPT, and Complex wounds) or John score 18 or lower:  No      Nurse 1 eSignature: Electronically signed by Penelope Osler, RN on 8/23/21 at 10:32 PM EDT    **SHARE this note so that the co-signing nurse is able to place an eSignature**    Nurse 2 eSignature: Electronically signed by Rudi Bobby RN on 8/24/21 at 6:59 AM EDT

## 2021-08-24 NOTE — CARE COORDINATION
Case Management Assessment           Initial Evaluation                Date / Time of Evaluation: 8/24/2021 3:17 PM                 Assessment Completed by: Yael Mora RN    Patient Name: Annette Lowe     YOB: 1960  Diagnosis: Supratherapeutic INR [R79.1]  Acute hypoxemic respiratory failure ([address] [J96.01]  Acute hypoxemic respiratory failure due to COVID-19 ([address] [U07.1, J96.01]  COVID-19 [U07.1]     Date / Time: 8/23/2021 11:57 AM    Patient Admission Status: Inpatient    If patient is discharged prior to next notation, then this note serves as note for discharge by case management. Current PCP: No primary care provider on file. Clinic Patient: No    Chart Reviewed: Yes  Patient/ Family Interviewed: Yes    Initial assessment completed at bedside with: patient over the phone    Hospitalization in the last 30 days: No    Emergency Contacts:  Extended Emergency Contact Information  Primary Emergency Contact: luann valle  Address: [address], [address] [place] [address] Phone: [phone]  Mobile Phone: [phone]  Relation: Spouse  Preferred language: English   needed? No  Secondary Emergency Contact: torikarissa  Address: [address], [address] [place] [address] Phone: [phone]  Mobile Phone: [phone]  Relation: Child  Preferred language: English   needed?  No    Advance Directives:   Code Status: Full Code      Financial  Payor: [address] / Plan: BCBS - [place] PPO / Product Type: *No Product type* /     Pre-cert required for SNF: Yes    Pharmacy    [address], [address] [place] [phone] Aracelis Mo [phone]  [address] [place] [address]  Phone: [phone] Fax: [phone]      Potential assistance Purchasing Medications: Potential Assistance Purchasing Medications: No  Does Patient want to participate in local refill/ meds to beds program?: No    Meds To Beds General Rules:  1. Can ONLY be done Monday- Friday between 8:30am-5pm  2. Prescription(s) must be in pharmacy by 3pm to be filled same day  3. Copy of patient's insurance/ prescription drug card and patient face sheet must be sent along with the prescription(s)  4. Cost of Rx cannot be added to hospital bill. If financial assistance is needed, please contact unit  or ;  or  CANNOT provide pharmacy voucher for patients co-pays  5. Patients can then  the prescription on their way out of the hospital at discharge, or pharmacy can deliver to the bedside if staff is available. (payment due at time of pick-up or delivery - cash, check, or card accepted)     Able to afford home medications/ co-pay costs: Yes    ADLS  Support Systems: Family Members    PT AM-PAC:   /24  OT AM-PAC:   /24    [place]: from home with son  Steps: few    Plans to RETURN to current housing: Yes  Barriers to RETURNING to current housing: none    Home Care Information  Currently ACTIVE with [address]: No  Home Care Agency: Not Applicable          Durable Medical Equipment  DME Provider: n/a  Equipment: n/a    Home Oxygen and Respiratory Equipment  Has [facility] prior to admission: No  [address]: Not Applicable        DISCHARGE PLAN:  Disposition: Home- No Services Needed    Transportation PLAN for discharge: family     Factors facilitating achievement of predicted outcomes: Family support, Motivated, Cooperative and Pleasant    Barriers to discharge: Medical complications    Additional Case Management Notes: Patient is from home with son, independent pta. No CM needs at this time. Son to transport home at discharge.     The Plan for Transition of Care is related to the following treatment goals of Supratherapeutic INR [R79.1]  Acute hypoxemic respiratory failure ([facility] [J96.01]  Acute hypoxemic respiratory failure due to COVID-19 ([facility] [U07.1, J96.01]  COVID-19 [U07.1]    The Patient and/or patient representative Amanda Villagomez and her family were provided with a choice of provider and agrees with the discharge plan Yes    Freedom of choice list was provided with basic dialogue that supports the patient's individualized plan of care/goals and shares the quality data associated with the providers.  Yes    Care Transition patient: No    Eli Villavicencio RN  [facility]  Case Management Department  Ph: [phone]   Fax: [phone]

## 2021-08-24 NOTE — PROGRESS NOTES
Patient ambulated to restroom on room air, prior to ambulation she was 95% on RA. She walked to the bathroom and back. Once she returned she was at 85% and stayed in that range for several minutes. Placed 0. 5LPM via NC on patient and she recovered to 92%. Will continue 0.5LPM at this time.

## 2021-08-24 NOTE — PLAN OF CARE
Problem: Airway Clearance - Ineffective  Goal: Achieve or maintain patent airway  Outcome: Ongoing  Note: Pt on 3L of O2 with O2 saturations at 95%. No complaints of SOB at rest. Slight SOB with exertion. Will continue to monitor and attempt to wean. Problem: Body Temperature -  Risk of, Imbalanced  Goal: Ability to maintain a body temperature within defined limits  Outcome: Ongoing  Note: Pt afebrile throughout shift. No complaints of chills.  Will continue to monitor

## 2021-08-25 LAB
ALBUMIN SERPL-MCNC: 3.3 G/DL (ref 3.4–5)
ALP BLD-CCNC: 47 U/L (ref 40–129)
ALT SERPL-CCNC: 15 U/L (ref 10–40)
ANION GAP SERPL CALCULATED.3IONS-SCNC: 15 MMOL/L (ref 3–16)
AST SERPL-CCNC: 26 U/L (ref 15–37)
BILIRUB SERPL-MCNC: 0.5 MG/DL (ref 0–1)
BILIRUBIN DIRECT: <0.2 MG/DL (ref 0–0.3)
BILIRUBIN, INDIRECT: ABNORMAL MG/DL (ref 0–1)
BUN BLDV-MCNC: 16 MG/DL (ref 7–20)
C-REACTIVE PROTEIN: 68.2 MG/L (ref 0–5.1)
CALCIUM SERPL-MCNC: 9.2 MG/DL (ref 8.3–10.6)
CHLORIDE BLD-SCNC: 98 MMOL/L (ref 99–110)
CO2: 24 MMOL/L (ref 21–32)
CREAT SERPL-MCNC: 0.8 MG/DL (ref 0.6–1.2)
GFR AFRICAN AMERICAN: >60
GFR NON-AFRICAN AMERICAN: >60
GLUCOSE BLD-MCNC: 161 MG/DL (ref 70–99)
INR BLD: 12.08 (ref 0.88–1.12)
POTASSIUM SERPL-SCNC: 3.9 MMOL/L (ref 3.5–5.1)
PROTHROMBIN TIME: 151.5 SEC (ref 9.9–12.7)
SODIUM BLD-SCNC: 137 MMOL/L (ref 136–145)
TOTAL PROTEIN: 7.3 G/DL (ref 6.4–8.2)

## 2021-08-25 PROCEDURE — 36415 COLL VENOUS BLD VENIPUNCTURE: CPT

## 2021-08-25 PROCEDURE — 6360000002 HC RX W HCPCS: Performed by: INTERNAL MEDICINE

## 2021-08-25 PROCEDURE — 85610 PROTHROMBIN TIME: CPT

## 2021-08-25 PROCEDURE — 2060000000 HC ICU INTERMEDIATE R&B

## 2021-08-25 PROCEDURE — 6370000000 HC RX 637 (ALT 250 FOR IP): Performed by: INTERNAL MEDICINE

## 2021-08-25 PROCEDURE — 2580000003 HC RX 258: Performed by: INTERNAL MEDICINE

## 2021-08-25 PROCEDURE — 80048 BASIC METABOLIC PNL TOTAL CA: CPT

## 2021-08-25 PROCEDURE — 2500000003 HC RX 250 WO HCPCS: Performed by: INTERNAL MEDICINE

## 2021-08-25 PROCEDURE — 86140 C-REACTIVE PROTEIN: CPT

## 2021-08-25 PROCEDURE — 80076 HEPATIC FUNCTION PANEL: CPT

## 2021-08-25 RX ORDER — PHYTONADIONE 5 MG/1
5 TABLET ORAL ONCE
Status: COMPLETED | OUTPATIENT
Start: 2021-08-25 | End: 2021-08-25

## 2021-08-25 RX ADMIN — Medication 40 MG: at 15:28

## 2021-08-25 RX ADMIN — Medication 2000 UNITS: at 09:50

## 2021-08-25 RX ADMIN — GUAIFENESIN AND DEXTROMETHORPHAN 5 ML: 100; 10 SYRUP ORAL at 04:02

## 2021-08-25 RX ADMIN — GUAIFENESIN AND DEXTROMETHORPHAN 5 ML: 100; 10 SYRUP ORAL at 09:50

## 2021-08-25 RX ADMIN — PHYTONADIONE 5 MG: 5 TABLET ORAL at 15:28

## 2021-08-25 RX ADMIN — PANTOPRAZOLE SODIUM 40 MG: 40 TABLET, DELAYED RELEASE ORAL at 09:52

## 2021-08-25 RX ADMIN — REMDESIVIR 100 MG: 100 INJECTION, POWDER, LYOPHILIZED, FOR SOLUTION INTRAVENOUS at 18:43

## 2021-08-25 RX ADMIN — SODIUM CHLORIDE 25 ML/HR: 9 INJECTION, SOLUTION INTRAVENOUS at 18:42

## 2021-08-25 RX ADMIN — GUAIFENESIN AND DEXTROMETHORPHAN 5 ML: 100; 10 SYRUP ORAL at 20:12

## 2021-08-25 RX ADMIN — ZINC SULFATE 220 MG (50 MG) CAPSULE 50 MG: CAPSULE at 09:50

## 2021-08-25 RX ADMIN — Medication 40 MG: at 03:54

## 2021-08-25 RX ADMIN — Medication 10 ML: at 09:51

## 2021-08-25 ASSESSMENT — PAIN SCALES - GENERAL
PAINLEVEL_OUTOF10: 0

## 2021-08-25 NOTE — PLAN OF CARE
Problem: Airway Clearance - Ineffective  Goal: Achieve or maintain patent airway  Outcome: Ongoing     Problem: Gas Exchange - Impaired  Goal: Absence of hypoxia  Outcome: Ongoing  Goal: Promote optimal lung function  Outcome: Ongoing     Problem: Breathing Pattern - Ineffective  Goal: Ability to achieve and maintain a regular respiratory rate  Outcome: Ongoing     Problem:  Body Temperature -  Risk of, Imbalanced  Goal: Ability to maintain a body temperature within defined limits  Outcome: Ongoing  Goal: Will regain or maintain usual level of consciousness  Outcome: Ongoing  Goal: Complications related to the disease process, condition or treatment will be avoided or minimized  Outcome: Ongoing     Problem: Isolation Precautions - Risk of Spread of Infection  Goal: Prevent transmission of infection  Outcome: Ongoing     Problem: Nutrition Deficits  Goal: Optimize nutritional status  Outcome: Ongoing     Problem: Risk for Fluid Volume Deficit  Goal: Maintain normal heart rhythm  Outcome: Ongoing  Goal: Maintain absence of muscle cramping  Outcome: Ongoing  Goal: Maintain normal serum potassium, sodium, calcium, phosphorus, and pH  Outcome: Ongoing     Problem: Loneliness or Risk for Loneliness  Goal: Demonstrate positive use of time alone when socialization is not possible  Outcome: Ongoing     Problem: Fatigue  Goal: Verbalize increase energy and improved vitality  Outcome: Ongoing     Problem: Patient Education: Go to Patient Education Activity  Goal: Patient/Family Education  Outcome: Ongoing     Problem: Nutrition  Goal: Optimal nutrition therapy  8/25/2021 1609 by Lucia Henriquez RN  Outcome: Ongoing  8/25/2021 1502 by Stephany Carter MS, RD, LD  Outcome: Ongoing

## 2021-08-25 NOTE — ACP (ADVANCE CARE PLANNING)
Advance Care Planning     Attempted to reach the pt for advance care planning, pt did not answer. Pt does not have a HCPOA on file. Her , Carmen Hansen, would be her legal NOK in the event of an emergency. She is listed as a full code. Will attempt to reach the pt again later on. The pt is not being following by the palliative care team. If in need of a goals of care discussion or symptom management, please consult palliative care.      Barak Lee, APRN - CNP

## 2021-08-25 NOTE — PROGRESS NOTES
Hospitalist Progress Note      PCP: No primary care provider on file. Date of Admission: 8/23/2021    Subjective: Seen and examined  Feeling better  INR is still elevated to 12, getting another dose of vitamin K oral  Still short of breath with ambulation, appears anxious    Medications:  Reviewed    Infusion Medications    sodium chloride 25 mL (08/24/21 1848)     Scheduled Medications    methylPREDNISolone  40 mg IntraVENous Q12H    Vitamin D  2,000 Units Oral Daily    sodium chloride flush  5-40 mL IntraVENous 2 times per day    zinc sulfate  50 mg Oral Daily    pantoprazole  40 mg Oral QAM AC    remdesivir IVPB  100 mg IntraVENous Q24H     PRN Meds: guaiFENesin-dextromethorphan, sodium chloride flush, sodium chloride, acetaminophen **OR** acetaminophen, promethazine **OR** ondansetron, polyethylene glycol, sodium chloride, simethicone      Intake/Output Summary (Last 24 hours) at 8/25/2021 1450  Last data filed at 8/25/2021 0949  Gross per 24 hour   Intake 450 ml   Output 1500 ml   Net -1050 ml       Physical Exam Performed:    /81   Pulse 80   Temp 97.8 °F (36.6 °C) (Oral)   Resp 16   Ht 5' 2\" (1.575 m)   Wt 103 lb 2.8 oz (46.8 kg)   SpO2 94%   BMI 18.87 kg/m²     General appearance:  Ill appearing  HEENT:  Normal cephalic, atraumatic without obvious deformity. Pupils equal, round, and reactive to light. Extra ocular muscles intact. Conjunctivae/corneas clear. Neck: Supple, with full range of motion. No jugular venous distention. Trachea midline. Respiratory:  Normal respiratory effort. Decreased breath sounds, doesn't have strong respiratory effort, significant crackles bilaterally from bases up to mid lungs  Cardiovascular:  Regular rate and rhythm with normal S1/S2 without murmurs, rubs or gallops. Abdomen: Soft, non-tender, non-distended with normal bowel sounds. Musculoskeletal:  No clubbing, cyanosis or edema bilaterally. Full range of motion without deformity.   Skin: Skin color, texture, turgor normal.  No rashes or lesions. Neurologic:  Neurovascularly intact without any focal sensory/motor deficits. Cranial nerves: II-XII intact, grossly non-focal.  Psychiatric:  Alert and oriented, thought content appropriate, normal insight  Capillary Refill: Brisk,< 3 seconds   Peripheral Pulses: +2 palpable, equal bilaterally     Labs:   Recent Labs     08/23/21  1219   WBC 4.6   HGB 15.2   HCT 44.6   PLT 92*     Recent Labs     08/23/21  1621 08/24/21  0449 08/25/21  0427   * 138 137   K 4.2 4.6 3.9   CL 98* 102 98*   CO2 21 23 24   BUN 8 14 16   CREATININE 0.8 0.7 0.8   CALCIUM 8.7 8.9 9.2     Recent Labs     08/23/21  1621 08/24/21  0449 08/25/21  0427   AST 34 29 26   ALT 14 13 15   BILIDIR <0.2 <0.2 <0.2   BILITOT 0.4 0.3 0.5   ALKPHOS 42 43 47     Recent Labs     08/23/21  1444 08/24/21  0449 08/25/21  1237   INR >14.27* >14.27* 12.08*     No results for input(s): CKTOTAL, TROPONINI in the last 72 hours. Urinalysis:      Lab Results   Component Value Date    NITRU Negative 08/23/2021    WBCUA 0-2 08/23/2021    RBCUA 21-50 08/23/2021    BLOODU LARGE 08/23/2021    SPECGRAV 1.015 08/23/2021    GLUCOSEU Negative 08/23/2021       Radiology:  XR CHEST PORTABLE   Final Result      Bilateral lower lung airspace disease concerning for atypical/Covid 19 pneumonia. Assessment/Plan:    Active Hospital Problems    Diagnosis     Pneumonia due to 2019 novel coronavirus [U07.1, J12.82]     Hyponatremia [E87.1]     Supratherapeutic INR [R79.1]     Acute hypoxemic respiratory failure due to COVID-19 (HCC) [U07.1, J96.01]      covid pna  Monitor O2 sats, place O2 to maintain for SpO2 > 90%  If Severe respiratory failure and need for noninvasive positive pressure ventilation, need negative pressure room as is more likely causes aerosolized secretions (BiPaP, CPAP, HiFlo NC).  N95 with proper fitting for aerosolized procedures  Personal protective equipment including gown gloves surgical mask, eye protection  Monitor closely, if worse then will consider ID/Pulmonary evaluation     Hyponatremia likely due to decreased po intake, diarrhea, possible some SIADH from pneumonia. Will check UA, Urine Sodium     Supratherapeutic INR > 14 on admission, s/p oral Vit K  Follow up INR in am  Hold off any pharmacological Px at this time      DVT Prophylaxis: on coumadin  Diet: ADULT DIET;  Regular  Code Status: Full Code    PT/OT Eval Status: n/a     Dispo - inpatient , tentative discharge tomorrow with home oxygen    Martin Dow MD

## 2021-08-25 NOTE — PROGRESS NOTES
Comprehensive Nutrition Assessment    RD did not conduct direct, in-person nutrition evaluation in efforts to reduce exposure and use of PPE for high risk persons, PUI persons, patients who have tested positive for Covid-19 or those awaiting respiratory panel results. EMR was screened for nutrition risk factors, as defined per nutrition standards of care. Nutrition history and assessment obtained through RD EMR review and RN report. To preserve PPE and reduce risk of exposure, RD did not conduct in-person nutrition eval at this time. Nutrition history and assessment obtained through RD EMR review and RN report. The patient will still be monitored for a change in status and re-entery into nutrition care at a later date. Consult dietitian if nutrition interventions essential to patient care is needed. RECOMMENDATIONS:  1. PO Diet: Continue current regular diet   2. ONS: Start High Calorie/ High Protein oral nutrition supplement bid   3. Nutrition Education: Will offer diet edu as appropriate       NUTRITION ASSESSMENT:   Type and Reason for Visit:  Type and Reason for Visit: Initial     Nutritional summary & status: Unable to go into pt's room d/t Covid-19. Called pt's room; unable to contact. Per EMR, po intake reflects 50-75% of meals consumed. Per nurse, pt does not order a lot of food so adding ONS is necessary. Will continue to monitor po intake of meals and ONS. Patient admitted d/t cough, fever, SOB, and fatigue    PMH significant for: HTN     MALNUTRITION ASSESSMENT  Context of Malnutrition: Acute Illness   Malnutrition Status: Insufficient data    NUTRITION DIAGNOSIS   · Inadequate oral intake related to  (decreased appetite) as evidenced by BMI and poor po intake at meals.     NUTRITION INTERVENTION  Food and/or Nutrient Delivery:  Continue Current Diet, Start Oral Nutrition Supplement  Nutrition Education/Counseling:  No recommendation at this time   Goals:  Pt will tolerate >50% of meals and oral nutrition supplements throughout admission. Nutrition Monitoring and Evaluation:   Food/Nutrient Intake Outcomes:  Food and Nutrient Intake, Supplement Intake  Physical Signs/Symptoms Outcomes:  Biochemical Data, GI Status, Weight     OOBJECTIVE DATA: Significant to nutrition assessment  · Nutrition-Focused Physical Findings: Nutrition Related Findings: No BM recorded    · Labs: Reviewed; Na 134, Glu 132  · Meds: Reviewed; Vit D, Zinc sulfate   · Wounds: Wound Type: None     CURRENT NUTRITION THERAPIES  ADULT DIET; Regular     PO Intake: Average Meal Intake: 51-75%, %   PO Supplement Intake:Average Supplements Intake: None Ordered  IVF: none    ANTHROPOMETRICS  Current Height: 5' 2\" (157.5 cm)  Current Weight: 103 lb 2.8 oz (46.8 kg)    Admission weight: 103 lb 9.9 oz (47 kg)  Ideal Body Weight (lbs) (Calculated): 110 lbs (Ideal Body Weight (Kg) (Calculated): 50 kg)  Usual Bodyweight KITTY  Weight Changes  KITTY      BMI BMI (Calculated): 18.9    Wt Readings from Last 50 Encounters:   08/25/21 103 lb 2.8 oz (46.8 kg)   02/04/21 112 lb (50.8 kg)     COMPARATIVE STANDARDS  Energy (kcal):  7465-3250 (30-35 kcal/kg); Weight Used for Energy Requirements:  Admission     Protein (g):  70-80  (1.5-1.7 gm/kg); Weight Used for Protein Requirements:  Admission        Fluid (ml/day):  >1500 mL; Method Used for Fluid Requirements:  1 ml/kcal      The patient will still be monitored per nutrition standards of care. Consult dietitian if nutrition interventions essential to patient care is needed.      Milo Cummings, [address]  [place]:  [phone]

## 2021-08-26 VITALS
HEART RATE: 79 BPM | HEIGHT: 62 IN | TEMPERATURE: 98.1 F | OXYGEN SATURATION: 95 % | WEIGHT: 103.17 LBS | SYSTOLIC BLOOD PRESSURE: 126 MMHG | RESPIRATION RATE: 18 BRPM | BODY MASS INDEX: 18.99 KG/M2 | DIASTOLIC BLOOD PRESSURE: 88 MMHG

## 2021-08-26 LAB
ALBUMIN SERPL-MCNC: 2.9 G/DL (ref 3.4–5)
ALP BLD-CCNC: 48 U/L (ref 40–129)
ALT SERPL-CCNC: 17 U/L (ref 10–40)
ANION GAP SERPL CALCULATED.3IONS-SCNC: 16 MMOL/L (ref 3–16)
AST SERPL-CCNC: 25 U/L (ref 15–37)
BILIRUB SERPL-MCNC: 0.5 MG/DL (ref 0–1)
BILIRUBIN DIRECT: <0.2 MG/DL (ref 0–0.3)
BILIRUBIN, INDIRECT: ABNORMAL MG/DL (ref 0–1)
BUN BLDV-MCNC: 19 MG/DL (ref 7–20)
CALCIUM SERPL-MCNC: 9 MG/DL (ref 8.3–10.6)
CHLORIDE BLD-SCNC: 102 MMOL/L (ref 99–110)
CO2: 22 MMOL/L (ref 21–32)
CREAT SERPL-MCNC: 0.8 MG/DL (ref 0.6–1.2)
GFR AFRICAN AMERICAN: >60
GFR NON-AFRICAN AMERICAN: >60
GLUCOSE BLD-MCNC: 149 MG/DL (ref 70–99)
INR BLD: 2.83 (ref 0.88–1.12)
POTASSIUM SERPL-SCNC: 4.6 MMOL/L (ref 3.5–5.1)
PROTHROMBIN TIME: 33.4 SEC (ref 9.9–12.7)
SODIUM BLD-SCNC: 140 MMOL/L (ref 136–145)
TOTAL PROTEIN: 6.4 G/DL (ref 6.4–8.2)

## 2021-08-26 PROCEDURE — 85610 PROTHROMBIN TIME: CPT

## 2021-08-26 PROCEDURE — 6370000000 HC RX 637 (ALT 250 FOR IP): Performed by: INTERNAL MEDICINE

## 2021-08-26 PROCEDURE — 6360000002 HC RX W HCPCS: Performed by: INTERNAL MEDICINE

## 2021-08-26 PROCEDURE — 94618 PULMONARY STRESS TESTING: CPT

## 2021-08-26 PROCEDURE — 80048 BASIC METABOLIC PNL TOTAL CA: CPT

## 2021-08-26 PROCEDURE — 80076 HEPATIC FUNCTION PANEL: CPT

## 2021-08-26 PROCEDURE — 2580000003 HC RX 258: Performed by: INTERNAL MEDICINE

## 2021-08-26 PROCEDURE — 36415 COLL VENOUS BLD VENIPUNCTURE: CPT

## 2021-08-26 RX ORDER — CHOLECALCIFEROL (VITAMIN D3) 50 MCG
2000 TABLET ORAL DAILY
Qty: 7 TABLET | Refills: 0 | Status: SHIPPED | OUTPATIENT
Start: 2021-08-27 | End: 2021-09-03

## 2021-08-26 RX ORDER — ZINC SULFATE 50(220)MG
50 CAPSULE ORAL DAILY
Qty: 30 CAPSULE | Refills: 0 | COMMUNITY
Start: 2021-08-27

## 2021-08-26 RX ORDER — WARFARIN SODIUM 5 MG/1
2 TABLET ORAL DAILY
Qty: 30 TABLET | Refills: 3 | Status: CANCELLED | OUTPATIENT
Start: 2021-08-26

## 2021-08-26 RX ORDER — DEXAMETHASONE 6 MG/1
6 TABLET ORAL
Qty: 8 TABLET | Refills: 0 | Status: SHIPPED | OUTPATIENT
Start: 2021-08-26 | End: 2021-09-03

## 2021-08-26 RX ORDER — WARFARIN SODIUM 5 MG/1
TABLET ORAL
Qty: 30 TABLET | Refills: 2
Start: 2021-08-26

## 2021-08-26 RX ADMIN — Medication 10 ML: at 08:54

## 2021-08-26 RX ADMIN — ZINC SULFATE 220 MG (50 MG) CAPSULE 50 MG: CAPSULE at 08:53

## 2021-08-26 RX ADMIN — Medication 40 MG: at 15:04

## 2021-08-26 RX ADMIN — GUAIFENESIN AND DEXTROMETHORPHAN 5 ML: 100; 10 SYRUP ORAL at 08:58

## 2021-08-26 RX ADMIN — PANTOPRAZOLE SODIUM 40 MG: 40 TABLET, DELAYED RELEASE ORAL at 05:20

## 2021-08-26 RX ADMIN — Medication 40 MG: at 04:41

## 2021-08-26 RX ADMIN — SIMETHICONE CHEW TAB 80 MG 80 MG: 80 TABLET ORAL at 06:01

## 2021-08-26 RX ADMIN — Medication 2000 UNITS: at 08:53

## 2021-08-26 NOTE — CARE COORDINATION
CTN contacted Debbie with [facility] [phone]. They have accepted this patient and will pull referral from [facility].  They will contact patient and make arrangements for [facility] by 8/26/21  Electronically signed by Mo Webster LPN on 5/98/1118 at 2:82 PM

## 2021-08-26 NOTE — PROGRESS NOTES
[facility]    Respiratory Therapy     Home Oxygen Evaluation        Name: [address] Record Number: [id number]  Age: 62 y.o. Gender:  female   : 1960  Today's date: 2021  Room: [address] [place] [address]      Assessment        /86   Pulse 84   Temp 97.5 °F (36.4 °C) (Oral)   Resp 18   Ht 5' 2\" (1.575 m)   Wt 103 lb 2.8 oz (46.8 kg)   SpO2 95%   BMI 18.87 kg/m²     Patient Active Problem List   Diagnosis    Pneumonia due to 2019 novel coronavirus    Hyponatremia    Supratherapeutic INR    Acute hypoxemic respiratory failure due to COVID-19 [facility])       Social History:  Social History     Tobacco Use    Smoking status: Never Smoker    Smokeless tobacco: Never Used   Substance Use Topics    Alcohol use: Not Currently    Drug use: Not Currently       Patient Room Air saturation at rest 90  %  Patient Room Air saturation upon ambulation 85 %    Oxygen saturations of 88% or less on RA qualifies patient for Home Oxygen    Patient resting on 2  lmp  with an oxygen saturation of  95 %     Patient ambulated on 2 lpm with an oxygen saturation of 92%    Qualifying patient for home oxygen with ambulation and continuous flow  @ 2 lpm.      In your clinical assessment does the Patient Require Portable Oxygen Tanks?     Yes               Patient/caregiver was educated on Home Oxygen process:  Yes      Level of patient/caregiver understanding able to:   [x] Verbalize understanding   [] Demonstrate understanding       [] Teach back        [] Needs reinforcement        []  No available caregiver               []  Other:     Response to education:  Very Good     Time Spent with Home O2 Set Up:  20  minutes     Katey Arnold RCP on 2021 at 11:07 AM                 .

## 2021-08-26 NOTE — DISCHARGE SUMMARY
Hospital Discharge Summary    Patient's PCP: No primary care provider on file. Admit Date: 8/23/2021   Discharge Date: 8/26/2021    Admitting Physician: Dr. Simone Jin MD  Discharge Physician: Dr. Talita Egan MD   Consults: none    HPI: 62 y.o. female Shoshana Pichardo who is lupus ac +ve, hx of unprovoked DVT on life long anticoagulation with coumadin, hypertension  - presents to the hospital w/ multiple complains of cough, shortness of breath, fever, and fatigue.  - She started to have symptoms x 10 days back which she thought was a sinus infection, with increased sinus pressure, followed by feeling Jocelyne Carpen in her stomach, loose watery diarrhea, later started to have high grade fever around 103 F, got COVID Ag test done from [facility] which came positive, she has been having progressively worsening symptoms, faye fatigue and nausea and has had decreased po intake due to this. She has been trying to eat fruits and drink water intermittent. She was found to be hypoxic to 86 % on room air and placed on 3L NC to maintain O2 sats, when walked to and from bathroom without oxygen, oxygen saturation droped in the high 70's spo2. She is not vaccinated for COVID, she was worried about the s/e of blood clots faye with her underlying lupus ac +ve status.     On admission BP ok, HR 110s, labs with Na 130, no leukocytosis, lymphopenia and thrombocytopenia noted. INR was > 14 and 5 mg oral vitamin K was given in the ED. Xray showed bilateral lower lobe ground glass opacities consistent with COVID19 pneumonia, and will be admitted to progressive care unit for acute respiratory failure due to [address] pneumonia      Brief hospital course:  Given the concern of the patients presentation and the concern of the possible multi-factorial etiology contributing to patients symptomatology. Patient was admitted and evaluated and found to have:      Discharge Diagnoses:    Active Hospital Problems     Diagnosis      Pneumonia due to 2019 novel coronavirus [U07.1, J12.82]      Hyponatremia [E87.1]      Supratherapeutic INR [R79.1]      Acute hypoxemic respiratory failure due to COVID-19 (HCC) [U07.1, J96.01]          Was treated with steroids, Remdesivir  Sats improved  Evaluated for Home O2: will be set up with Home O2    Hyponatremia likely due to decreased po intake, diarrhea. Resolved     Supratherapeutic INR > 14 on admission, s/p oral Vit K  - Therapeutic now at 2.8  - We will decrease home dose by 20 %, and recheck /inr in 3 days: Pxt to follow up with Dr. Matty Knutson (PCP) at [facility] within 5-7 days, and with result of INR for further adjustment if needed. - O/p F/u          Physical Exam: BP (!) 123/90   Pulse 75   Temp 97.7 °F (36.5 °C) (Oral)   Resp 20   Ht 5' 2\" (1.575 m)   Wt 103 lb 2.8 oz (46.8 kg)   SpO2 95%   BMI 18.87 kg/m²     No results for input(s): POCGLU in the last 72 hours.     General appearance: alert, appears stated age and cooperative  Head: Normocephalic, without obvious abnormality, atraumatic  Neck: no adenopathy, no carotid bruit, no JVD, supple, symmetrical, trachea midline and thyroid not enlarged, symmetric, no tenderness/mass/nodules  Lungs: clear to auscultation bilaterally  Heart: regular rate and rhythm, S1, S2 normal, no murmur, click, rub or gallop  Abdomen: soft, non-tender; bowel sounds normal; no masses,  no organomegaly  Extremities: extremities normal, atraumatic, no cyanosis or edema  Neurologic: Grossly normal    LABS:  Recent Labs     08/23/21  1219   WBC 4.6   HGB 15.2   PLT 92*      Recent Labs     08/25/21  0427      K 3.9   CL 98*   CO2 24   BUN 16   CREATININE 0.8   GLUCOSE 161*     Recent Labs     08/24/21  0449 08/25/21  1237 08/26/21  0433   INR >14.27* 12.08* 2.83*           Discharge Medications:   Tasia Daviesveland   Home Medication Instructions CHLOE:[id number]    Printed on:08/27/21 0842   Medication Information                      amLODIPine (NORVASC) 5 MG tablet  Take 5 mg by mouth daily             dexamethasone (DECADRON) 6 MG tablet  Take 1 tablet by mouth daily (with breakfast) for 8 days             lisinopril (PRINIVIL;ZESTRIL) 5 MG tablet  Take 5 mg by mouth daily             Vitamin D (CHOLECALCIFEROL) 50 MCG (2000 UT) TABS tablet  Take 1 tablet by mouth daily for 7 days             warfarin (COUMADIN) 5 MG tablet  Take 5 mg Tuesday, Thursday, Saturday and sunday  Take 2.5 mg Monday Wednesday and friday             zinc sulfate (ZINCATE) 220 (50 Zn) MG capsule  Take 1 capsule by mouth daily                Activity: activity as tolerated  Diet: cardiac diet  Wound Care: none needed    Disposition: home  Discharged Condition: Stable  Follow Up: Primary Care Physician in one week    Total time spent on discharge, finalizing medications, referrals and arranging outpatient follow up was more than 30 minutes    Thank you Dr. Galeas primary care provider on file. for the opportunity to be involved in this patients care. If you have any questions or concerns please feel free to contact me at [phone].

## 2021-08-26 NOTE — PROGRESS NOTES
Physical Therapy  Discharge    Referral received and chart reviewed. Per RN, pt is up ad ernie with no PT needs. Pt discharging home today with home care. Will sign off.     Luis Pierre

## 2021-08-26 NOTE — CARE COORDINATION
Case Management Assessment            Discharge Note                    Date / Time of Note: 8/26/2021 1:45 PM                  Discharge Note Completed by: Olivia Hankins RN    Patient Name: Jt Kurtz   YOB: 1960  Diagnosis: Supratherapeutic INR [R79.1]  Acute hypoxemic respiratory failure ([address] [J96.01]  Acute hypoxemic respiratory failure due to COVID-19 ([address] [U07.1, J96.01]  COVID-19 [U07.1]   Date / Time: 8/23/2021 11:57 AM    Current PCP: No primary care provider on file. Clinic patient: No    Hospitalization in the last 30 days: No    Advance Directives:  Code Status: Full Code  [place] DNR form completed and on chart: Not Indicated    Financial:  Payor: Raul Olguin / Plan: Tiffanie Avendaño PPO / Product Type: *No Product type* /      Pharmacy:    [facility], [address] [place] [address] [phone] [facility] [phone]  [address] [place] [address]  Phone: [phone] Fax: [phone]      Assistance purchasing medications?: Potential Assistance Purchasing Medications: No  Assistance provided by Case Management: None at this time    Does patient want to participate in local refill/ meds to beds program?: No    Meds To Beds General Rules:  1. Can ONLY be done Monday- Friday between 8:30am-5pm  2. Prescription(s) must be in pharmacy by 3pm to be filled same day  3. Copy of patient's insurance/ prescription drug card and patient face sheet must be sent along with the prescription(s)  4. Cost of Rx cannot be added to hospital bill. If financial assistance is needed, please contact unit  or ;  or  CANNOT provide pharmacy voucher for patients co-pays  5.  Patients can then  the prescription on their way out of the hospital at discharge, or pharmacy can deliver to the bedside if staff is available. (payment due at time of pick-up or delivery - cash, check, or card accepted)     Able to afford home medications/ co-pay costs: Yes    ADLS:  Current PT AM-PAC Score:   /24  Current OT AM-PAC Score:   /24      DISCHARGE Disposition: Home with Home Health Care: [facility]     LOC at discharge: Not Applicable  AMADOR Completed: Not Indicated    Notification completed in HENS/PAS?:  Not Applicable    IMM Completed:   Not Indicated    Transportation:  Transportation PLAN for discharge: family   Mode of Transport: [address] ordered at discharge: Yes  [address]: [facility]  Phone: [phone]  Fax: [phone]  Orders faxed: Yes    Durable Medical Equipment:  DME Provider: n/a  Equipment obtained during hospitalization: n/a    Home Oxygen and Respiratory Equipment:  Oxygen needed at discharge?: Yes  [address]: [facility]  Phone: [phone]   Portable tank available for discharge?: Yes      Referrals made at [facility] for outpatient continued care:  Not Applicable    Additional CM Notes:   Patient is from home with son, independent pta. Pt is weak from illness and worried about getting out for blood draws. [facility] to provide home care services for blood draws. Patient has portable O2 delivered to room provided by [facility]. Family to transport home at discharge. The Plan for Transition of Care is related to the following treatment goals of Supratherapeutic INR [R79.1]  Acute hypoxemic respiratory failure ([address] [J96.01]  Acute hypoxemic respiratory failure due to COVID-19 ([address] [U07.1, J96.01]  COVID-19 [U07.1]    The Patient and/or patient representative Stefanie Anton and her family were provided with a choice of provider and agrees with the discharge plan Yes    Freedom of choice list was provided with basic dialogue that supports the patient's individualized plan of care/goals and shares the quality data associated with the providers.  Yes    Care Transitions patient: No    William Thao RN  [facility]  Case Management Department  Ph: [phone]  Fax: [phone]

## 2021-08-27 ENCOUNTER — CARE COORDINATION (OUTPATIENT)
Dept: CASE MANAGEMENT | Age: 61
End: 2021-08-27

## 2021-08-27 NOTE — CARE COORDINATION
[address] Transitions Initial Follow Up Call    Call within 2 business days of discharge: Yes    Patient:  Anu Zepeda  Patient :  1960  MRN:  [id number]   Reason for Admission:  covid 19   Discharge Date:  21  RARS: 11    CTC attempt to reach Pt regarding recent hospital discharge. CTC unable to leave voice recording with call back number requesting a call back / fax machine. Follow up appointments:    No future appointments. JENNIFER Soni, RN  Care Transition Coordinator  Contact Number:  [phone]

## 2021-08-30 ENCOUNTER — CARE COORDINATION (OUTPATIENT)
Dept: CASE MANAGEMENT | Age: 61
End: 2021-08-30